# Patient Record
Sex: MALE | Race: WHITE | NOT HISPANIC OR LATINO
[De-identification: names, ages, dates, MRNs, and addresses within clinical notes are randomized per-mention and may not be internally consistent; named-entity substitution may affect disease eponyms.]

---

## 2018-10-10 PROBLEM — Z00.00 ENCOUNTER FOR PREVENTIVE HEALTH EXAMINATION: Status: ACTIVE | Noted: 2018-10-10

## 2018-10-19 ENCOUNTER — APPOINTMENT (OUTPATIENT)
Dept: UROLOGY | Facility: CLINIC | Age: 53
End: 2018-10-19
Payer: COMMERCIAL

## 2018-10-19 ENCOUNTER — OTHER (OUTPATIENT)
Age: 53
End: 2018-10-19

## 2018-10-19 DIAGNOSIS — F17.200 NICOTINE DEPENDENCE, UNSPECIFIED, UNCOMPLICATED: ICD-10-CM

## 2018-10-19 PROCEDURE — 99204 OFFICE O/P NEW MOD 45 MIN: CPT

## 2018-10-22 PROBLEM — F17.200 CURRENT SMOKER: Status: ACTIVE | Noted: 2018-10-22

## 2018-10-22 RX ORDER — NAPROXEN 500 MG/1
500 TABLET ORAL
Qty: 14 | Refills: 0 | Status: COMPLETED | COMMUNITY
Start: 2018-10-09

## 2018-10-22 RX ORDER — AMLODIPINE BESYLATE 5 MG/1
5 TABLET ORAL
Qty: 30 | Refills: 0 | Status: ACTIVE | COMMUNITY
Start: 2018-10-12

## 2018-10-22 RX ORDER — LOSARTAN POTASSIUM 100 MG/1
100 TABLET, FILM COATED ORAL
Qty: 30 | Refills: 0 | Status: ACTIVE | COMMUNITY
Start: 2018-10-12

## 2018-10-26 ENCOUNTER — APPOINTMENT (OUTPATIENT)
Dept: UROLOGY | Facility: CLINIC | Age: 53
End: 2018-10-26
Payer: COMMERCIAL

## 2018-10-26 DIAGNOSIS — I10 ESSENTIAL (PRIMARY) HYPERTENSION: ICD-10-CM

## 2018-10-26 PROCEDURE — 99213 OFFICE O/P EST LOW 20 MIN: CPT

## 2018-10-29 LAB
BILIRUB UR QL STRIP: NORMAL
GLUCOSE UR-MCNC: NORMAL
HCG UR QL: 0.2 EU/DL
HGB UR QL STRIP.AUTO: NORMAL
KETONES UR-MCNC: NORMAL
LEUKOCYTE ESTERASE UR QL STRIP: NORMAL
NITRITE UR QL STRIP: NORMAL
PH UR STRIP: 6
PROT UR STRIP-MCNC: NORMAL
SP GR UR STRIP: 1.02

## 2018-12-11 ENCOUNTER — MEDICATION RENEWAL (OUTPATIENT)
Age: 53
End: 2018-12-11

## 2018-12-21 ENCOUNTER — APPOINTMENT (OUTPATIENT)
Dept: UROLOGY | Facility: CLINIC | Age: 53
End: 2018-12-21
Payer: COMMERCIAL

## 2018-12-21 PROCEDURE — 99213 OFFICE O/P EST LOW 20 MIN: CPT

## 2019-01-15 ENCOUNTER — MEDICATION RENEWAL (OUTPATIENT)
Age: 54
End: 2019-01-15

## 2019-06-28 ENCOUNTER — APPOINTMENT (OUTPATIENT)
Dept: UROLOGY | Facility: CLINIC | Age: 54
End: 2019-06-28

## 2019-07-19 ENCOUNTER — APPOINTMENT (OUTPATIENT)
Dept: UROLOGY | Facility: CLINIC | Age: 54
End: 2019-07-19
Payer: COMMERCIAL

## 2019-07-19 DIAGNOSIS — N13.30 UNSPECIFIED HYDRONEPHROSIS: ICD-10-CM

## 2019-07-19 PROCEDURE — 99212 OFFICE O/P EST SF 10 MIN: CPT

## 2019-07-19 RX ORDER — POTASSIUM CITRATE 10 MEQ/1
10 MEQ TABLET, EXTENDED RELEASE ORAL TWICE DAILY
Qty: 180 | Refills: 2 | Status: ACTIVE | COMMUNITY
Start: 2018-10-19 | End: 1900-01-01

## 2019-07-19 NOTE — PHYSICAL EXAM
[General Appearance - Well Developed] : well developed [General Appearance - Well Nourished] : well nourished [Abdomen Soft] : soft [Abdomen Tenderness] : non-tender [Abdomen Mass (___ Cm)] : no abdominal mass palpated [Edema] : no peripheral edema [] : no respiratory distress [Exaggerated Use Of Accessory Muscles For Inspiration] : no accessory muscle use [Oriented To Time, Place, And Person] : oriented to person, place, and time

## 2019-07-19 NOTE — HISTORY OF PRESENT ILLNESS
[FreeTextEntry1] : patient seen with ureteral stones. He experienced acute onset of left flank pain, severe though no associated N/V or fevers, chills or hematuria. he went to local ER but by the time he was seen pain resolved. he had a Ct scan the following day noting a 1cm stone in  the upper pole of the left kidney and hydroureteronephrosis done to two distal stones. he has continued to have no further episodes of pain. UA notes pH 5 and uric acid crystals in urine. Serum UA ok.\par he passed a stone ~ 5 years ago, No prior procedures for stones. \par Started him on UroCitk thinking may be uric acid - continues to have NO pain. \par F/U USG notes stone now 5mm. hasn't seen any fragments etc\par No symptoms of a stone and no gravel in urine. No dysuria or hematuria. \par

## 2019-07-29 LAB
BILIRUB UR QL STRIP: NORMAL
GLUCOSE UR-MCNC: NORMAL
HCG UR QL: 0.2 EU/DL
HGB UR QL STRIP.AUTO: NORMAL
KETONES UR-MCNC: NORMAL
LEUKOCYTE ESTERASE UR QL STRIP: NORMAL
NITRITE UR QL STRIP: NORMAL
PH UR STRIP: 5.5
PROT UR STRIP-MCNC: NORMAL
SP GR UR STRIP: 1.02

## 2019-10-18 ENCOUNTER — APPOINTMENT (OUTPATIENT)
Dept: UROLOGY | Facility: CLINIC | Age: 54
End: 2019-10-18
Payer: COMMERCIAL

## 2019-10-18 ENCOUNTER — OUTPATIENT (OUTPATIENT)
Dept: OUTPATIENT SERVICES | Facility: HOSPITAL | Age: 54
LOS: 1 days | End: 2019-10-18
Payer: COMMERCIAL

## 2019-10-18 DIAGNOSIS — R35.0 FREQUENCY OF MICTURITION: ICD-10-CM

## 2019-10-18 DIAGNOSIS — N20.0 CALCULUS OF KIDNEY: ICD-10-CM

## 2019-10-18 PROCEDURE — 99213 OFFICE O/P EST LOW 20 MIN: CPT | Mod: 25

## 2019-10-18 PROCEDURE — 76775 US EXAM ABDO BACK WALL LIM: CPT

## 2019-10-18 PROCEDURE — 76775 US EXAM ABDO BACK WALL LIM: CPT | Mod: 26

## 2019-10-18 RX ORDER — TAMSULOSIN HYDROCHLORIDE 0.4 MG/1
0.4 CAPSULE ORAL
Qty: 30 | Refills: 5 | Status: ACTIVE | COMMUNITY
Start: 2018-10-19 | End: 1900-01-01

## 2019-10-18 NOTE — HISTORY OF PRESENT ILLNESS
[FreeTextEntry1] : patient seen with ureteral stones. He experienced acute onset of left flank pain, severe though no associated N/V or fevers, chills or hematuria. he went to local ER but by the time he was seen pain resolved. he had a Ct scan the following day noting a 1cm stone in  the upper pole of the left kidney and hydroureteronephrosis done to two distal stones. he has continued to have no further episodes of pain. UA notes pH 5 and uric acid crystals in urine. Serum UA ok.\par he passed a stone ~ 5 years ago, No prior procedures for stones. \par Started him on UroCitk thinking may be uric acid - continues to have NO pain. \par F/U USG notes stone now 5mm. hasn't seen any fragments etc\par No symptoms of a stone and no gravel in urine. No dysuria or hematuria. \par \par here today with 24 hours left flank pain - up to a 7; 5 now. no N/V or fevers chills.\par USG today 1 stone left (had 2 USG 88/19)  - with mild fullness.

## 2019-10-21 LAB — BACTERIA UR CULT: NORMAL

## 2019-10-23 DIAGNOSIS — N20.0 CALCULUS OF KIDNEY: ICD-10-CM

## 2019-10-23 DIAGNOSIS — R31.9 URINARY TRACT INFECTION, SITE NOT SPECIFIED: ICD-10-CM

## 2019-10-23 DIAGNOSIS — N39.0 URINARY TRACT INFECTION, SITE NOT SPECIFIED: ICD-10-CM

## 2020-05-19 ENCOUNTER — APPOINTMENT (OUTPATIENT)
Dept: UROLOGY | Facility: CLINIC | Age: 55
End: 2020-05-19
Payer: COMMERCIAL

## 2020-05-19 PROCEDURE — 99213 OFFICE O/P EST LOW 20 MIN: CPT

## 2020-05-19 NOTE — HISTORY OF PRESENT ILLNESS
[FreeTextEntry1] : patient seen in the past for ureteral stones. He experienced acute onset of left flank pain, severe though no associated N/V or fevers, chills or hematuria. he went to local ER but by the time he was seen pain resolved. he had a Ct scan the following day noting a 1cm stone in  the upper pole of the left kidney and hydroureteronephrosis done to two distal stones. he has continued to have no further episodes of pain. UA notes pH 5 and uric acid crystals in urine. Serum UA ok.\par he passed a stone ~ 5 years ago, No prior procedures for stones. \par Started him on UroCitk thinking may be uric acid; F/U USG notes stone now 5mm. hasn't seen any \par F/U USG 10/19 1 stone left (had 2 USG 88/19)  - with mild fullness. \par \par here today with painless gross hematuria off/on past week. He has started Eliquis for a DVT. No flank pr back pain; no dysuria or increased frequency urgency.

## 2020-05-19 NOTE — ASSESSMENT
[FreeTextEntry1] : Urine cloudy - will culture.\par given gross hematuria and prior smoking history needs work up - start with CT scan

## 2020-05-26 LAB — URINE CYTOLOGY: NORMAL

## 2020-07-07 ENCOUNTER — APPOINTMENT (OUTPATIENT)
Dept: UROLOGY | Facility: CLINIC | Age: 55
End: 2020-07-07
Payer: COMMERCIAL

## 2020-07-07 VITALS
HEART RATE: 71 BPM | TEMPERATURE: 97 F | BODY MASS INDEX: 37.89 KG/M2 | RESPIRATION RATE: 16 BRPM | SYSTOLIC BLOOD PRESSURE: 148 MMHG | HEIGHT: 68 IN | WEIGHT: 250 LBS | DIASTOLIC BLOOD PRESSURE: 87 MMHG

## 2020-07-07 VITALS — TEMPERATURE: 97.7 F

## 2020-07-07 DIAGNOSIS — R31.0 GROSS HEMATURIA: ICD-10-CM

## 2020-07-07 PROCEDURE — 99212 OFFICE O/P EST SF 10 MIN: CPT

## 2020-07-08 NOTE — HISTORY OF PRESENT ILLNESS
[FreeTextEntry1] : patient seen in the past for ureteral stones. He experienced acute onset of left flank pain, severe though no associated N/V or fevers, chills or hematuria. he went to local ER but by the time he was seen pain resolved. he had a Ct scan the following day noting a 1cm stone in  the upper pole of the left kidney and hydroureteronephrosis done to two distal stones. he has continued to have no further episodes of pain. UA notes pH 5 and uric acid crystals in urine. Serum UA ok.\par he passed a stone ~ 5 years ago, No prior procedures for stones. \par Started him on UroCitk thinking may be uric acid; F/U USG notes stone now 5mm. hasn't seen any \par F/U USG 10/19 1 stone left (had 2 USG 88/19)  - with mild fullness. \par \par recently seen with painless gross hematuria off/on past week. He has started Eliquis for a DVT. No flank pr back pain; no dysuria or increased frequency urgency. H/o prior smoking so had recommended w/u.\par had USG instead of CT - noted bilateral stones R>L.\par

## 2020-07-08 NOTE — ASSESSMENT
[FreeTextEntry1] : reiterated especially given smoking history needs recommended w/u including cystoscopy

## 2020-12-21 PROBLEM — N39.0 URINARY TRACT INFECTION WITHOUT HEMATURIA, SITE UNSPECIFIED: Status: RESOLVED | Noted: 2019-10-23 | Resolved: 2020-12-21

## 2020-12-21 PROBLEM — N39.0 URINARY TRACT INFECTION WITH HEMATURIA, SITE UNSPECIFIED: Status: RESOLVED | Noted: 2019-10-23 | Resolved: 2020-12-21

## 2022-01-09 ENCOUNTER — EMERGENCY (EMERGENCY)
Facility: HOSPITAL | Age: 57
LOS: 1 days | End: 2022-01-09
Attending: EMERGENCY MEDICINE
Payer: COMMERCIAL

## 2022-01-09 VITALS — WEIGHT: 278.22 LBS | TEMPERATURE: 98 F

## 2022-01-09 LAB
RAPID RVP RESULT: SIGNIFICANT CHANGE UP
SARS-COV-2 RNA SPEC QL NAA+PROBE: SIGNIFICANT CHANGE UP

## 2022-01-09 PROCEDURE — 99291 CRITICAL CARE FIRST HOUR: CPT

## 2022-01-09 PROCEDURE — 0225U NFCT DS DNA&RNA 21 SARSCOV2: CPT

## 2022-01-09 PROCEDURE — 99285 EMERGENCY DEPT VISIT HI MDM: CPT

## 2022-01-09 NOTE — ED PROVIDER NOTE - PROGRESS NOTE DETAILS
Patient's wife came to ED. Reported patient seemed normal this morning. Left the house around 7:30am. Neighbor found him next to his car. History of hypertension and high cholesterol. Denies any h/o drug or alcohol use. PMD Dr. Master Chicas. [phone] Dr. Chicas called back. I informed him of patient's death.

## 2022-01-09 NOTE — ED PROVIDER NOTE - OBJECTIVE STATEMENT
Per EMS, neighbor found patient outside, near his apartment. Was last seen by family 15-20 minutes prior to that. Upon EMS arrival, patient was cyanotic, in asystole. ACLS initiated. Rhythm became PEA, no ROSC. See PCR for EMS medications given. EMS performed ACLS for 37 minutes prior to arrival. Declared dead on arrival, time of death 08:43.

## 2022-01-09 NOTE — ED ADULT NURSE NOTE - OBJECTIVE STATEMENT
Per EMS, neighbor found patient outside, near his apartment. Was last seen by family 15-20 minutes prior to that. Upon EMS arrival, patient was cyanotic, in asystole. ACLS initiated. Rhythm became PEA, no ROSC. See PCR for EMS medications given. EMS performed ACLS for 37 minutes prior to arrival. Declared dead on arrival, time of death 08:43. organ donar called at 8:48 organ donar called  no [phone]. wife at bed side .keep the body clean .awaiting for morgue .

## 2022-01-09 NOTE — ED ADULT NURSE NOTE - NSIMPLEMENTINTERV_GEN_ALL_ED
Implemented All Universal Safety Interventions:  [place] to call system. Call bell, personal items and telephone within reach. Instruct patient to call for assistance. Room bathroom lighting operational. Non-slip footwear when patient is off stretcher. Physically safe environment: no spills, clutter or unnecessary equipment. Stretcher in lowest position, wheels locked, appropriate side rails in place. Implemented All Fall Risk Interventions:  [place] to call system. Call bell, personal items and telephone within reach. Instruct patient to call for assistance. Room bathroom lighting operational. Non-slip footwear when patient is off stretcher. Physically safe environment: no spills, clutter or unnecessary equipment. Stretcher in lowest position, wheels locked, appropriate side rails in place. Provide visual cue, wrist band, yellow gown, etc. Monitor gait and stability. Monitor for mental status changes and reorient to person, place, and time. Review medications for side effects contributing to fall risk. Reinforce activity limits and safety measures with patient and family.